# Patient Record
Sex: MALE | Race: BLACK OR AFRICAN AMERICAN | Employment: FULL TIME | URBAN - METROPOLITAN AREA
[De-identification: names, ages, dates, MRNs, and addresses within clinical notes are randomized per-mention and may not be internally consistent; named-entity substitution may affect disease eponyms.]

---

## 2022-12-13 NOTE — PROGRESS NOTES
New Patient Abstract    Reason for Referral: Multiple myeloma not having achieved remission    Referring Provider:  Rossana Kamara MD    Primary Care Provider: Evon Lofton MD    Family History of Cancer/Hematologic Disorders: There is no family history of cancer or hematologic disorders documented. Presenting Symptoms: Outpatient work-up for acute on chronic renal failure revealed elevated light chains consistent with multiple myeloma    Narrative with recent with Results/Procedures/Biopsies and Dates completed: Mr. Rahul Bowden is a 56-year-old male with a history of DM2, CKD4, myopia, and DOMENICO. He was admitted at [place] [address] on 10/20/22 after outpatient work-up for acute on chronic renal failure revealed elevated light chains consistent with multiple myeloma. He underwent renal biopsy on 10/21/22 which revealed kappa light chain cast nephropathy. Free kappa light chains on 10/21/22 were elevated at 13,243.8 and kappa/lambda ratio was elevated at 686.21. SPEP on 10/23/22 was significant for alpha-1 globulin 0.4, albumin 3.7, and abnormal protein band one 0.9. Evaluation revealed a restricted band (M-spike) migrating in the gamma globulin region. UPEP obtained on 10/23/22 showed UPEP 24-hour protein elevated at 23,875, protein/creatinine ratio 10,133, creatine urine 24 hour 2.36, and abnormal protein band one 15,599. A monoclonal type peak was present on the electrophoretogram which suggests a monoclonal gammopathy. Urine immunofixation showed abnormal free kappa light chains present. Bone survey on 10/22/22 demonstrated no definitive bone or soft tissue abnormality to suggest or explain hypercalcemia; no suspicious lytic or blastic lesions or abnormal periosteal reaction; degenerative changes; and suggestion of slight superior endplate compression fracture deformity of L2 versus rim osteophyte.      Bone marrow biopsy on 10/26/22 revealed kappa restricted plasma cell neoplasm plasma cells estimated 80%; bone marrow cellularity estimated 90%; increased storage iron; and peripheral blood with hypochromic microcytic anemia. Pathologist noted, DN00+ clonal plasma cells are detected that coexpress aberrant CD56 and kappa cytoplasmic light chain and are negative for CD19 and CD20, comprising 4% of all analyzed white blood cells (cKappa 98%, cLambda 2%). Lymphocytes include polyclonal B cells, NK cells and immunophenotypically normal CD4+ and CD8+ T cells in normal proportions. Myeloid forms exhibit immunophenotypic evidence of normal maturation. Blasts are not increased.  Chromosome analysis showed an abnormal, hyperdiploid karyotype. FISH myeloma IGH panel showed positive FISH result for gain of extra IGH signals and gain of CCND1; negative for IGH::FGFR3, IGH::CCND1, IGH::MAF, IGH::MAFB rearrangement. The extra IGH signals were most consistent with an [address] translocation with another partner. The prognostic significance of this finding is uncertain. FISH myeloma risk assessment panel showed positive FISH result for hyperdiploidy; negative for gain of 1q21 and deletion 13q/monosomy 13. Leukemia/lymphoma eval showed involvement by a plasma cell neoplasm. CD38+ clonal plasma cells were detected that coexpress aberrant CD56 and kappa cytoplasmic light chain and were negative for CD19 and CD20, comprising 4% of all analyzed white blood cells (cKappa 98%, cLambda 2%). Lymphocytes included polyclonal B cells, NK cells and immunophenotypically normal CD4+ and CD8+ T cells in normal proportions. Myeloid forms exhibited immunophenotypic evidence of normal maturation. Blasts were not increased. During admission he was seen by hematology service and was started on chemotherapy with Cytoxan and bortezomib on 10/24. He tolerated the chemotherapy well and had no significant complications or adverse reactions. The patient was also started on plasmapheresis and completed a total of 5 sessions.  He was discharged on 10/28/22 with outpatient hematology follow-up with Dr. Ravi Byrd at [address] [place] [address]. PET CT on 11/10/22 demonstrated mild diffuse homogeneous increased FDG accumulation in bone marrow, consistent with the clinical history of multiple myeloma; and a healing fracture at the lateral right 7th rib with an SUV max of 5.1. There were no other focal bone lesions. Patient's diagnosis is Multiple Myeloma stage 3 [b2 microglobulin is 10 (> 5.5) so stage 3]. Light chains decreased after admission with cytoxan and fluids in hospital. Patient is currently being treated with [facility] Bortezomib + Cyclophosphamide IV + Dexamethasone (Q 21 days). He is on cycle 3 of a planned 4 cycles set to run from 10/24/2022 to 1/10/2023. MD notes, Continue chemotherapy with CyborD due to renal failure. Discussed case with Dr. Colt Gaston and plan to refer him to [facility] for transplant evaluation. Also if CyborD not enough to control myeloma will consider VDT-PACE regimen. Revlimid not possible due to renal dysfunction.      KIDNEY ULTRASOUND 10/17/2022   FINDINGS: The kidneys demonstrate no evidence of hydronephrosis, mass, appreciable cortical thinning, cortical scarring, or shadowing renal stone. Bilaterally the kidneys exhibit generalized symmetric decreased corticomedullary differentiation and increased echogenicity of the kidney cortex which is nonspecific but would be most commonly seen related to chronic medical renal disease. The right kidney length measures approximately 11.7 cm. The left kidney length measures approximately 12.7 cm. The kidneys otherwise are negative bilaterally. The urinary bladder contains urine, is not abnormally distended and is normal in appearance. Permanent recorded images were made and are on file. IMPRESSION: Ultrasound features of the kidneys are most compatible with chronic medical renal disease, as described.      RENAL BIOPSY 10/21/22      XR OSSEOUS SURVEY 10/22/22  FINDINGS:   Bones/joints: Proximal right tibial oblong radiodensity measuring 1.7 cm compatible with bone island. Mineralization appears normal.  Cervical, thoracic and lumbar spondylosis changes. Suggestion of slight superior endplate compression fracture deformity of L2 versus rim osteophyte. No evidence of fracture otherwise. Joints are unremarkable for age. No suspicious lytic or blastic lesions or abnormal periosteal reaction. Soft tissues: Unremarkable. Right IJ central venous dialysis catheter with its tip in the SVC. Visualized lungs are clear. No evidence of calculi. IMPRESSION:   1. No definitive bone or soft tissue abnormality to suggest or explain hypercalcemia. 2. No suspicious lytic or blastic lesions or abnormal periosteal reaction. 3. Degenerative changes. Suggestion of slight superior endplate compression fracture deformity of L2 versus rim osteophyte. 4. Right IJ central venous dialysis catheter with its tip in the SVC. Visualized lungs are clear. IGM 10/22/22      IGA 10/22/22          PROTEIN ELECTROPHORESIS 10/23/22   Ref Range & Units 1 mo ago Comments   Protein Total 6.1 - 8.1 g/dL 6.7     Alpha-1 Globulin 0.2 - 0.3 g/dL 0.4 High      Alpha-2 Globulin 0.5 - 0.9 g/dL 0.6     Albumin 3.8 - 4.8 g/dL 3.7 Low      Beta-1 Globulin 0.4 - 0.6 g/dL 0.4     Gamma Globulin 0.8 - 1.7 g/dL 1.4     Abnormal Protein Band 1 g/dL 0.9 High          Reference Range: None Detected   Abnormal Protein Band 2  NO RESULT     Abnormal Protein Band 3  NO RESULT     SPEP Interpretation  SEE BELOW Abnormal   Evaluation reveals a restricted band (M-spike) migrating in the gamma globulin region. If not already requested, Immunofixation should be considered.    Beta-2 Globulin 0.2 - 0.5 g/dL 0.3       PROTEIN ELECTROPHORESIS 11/17/22   Ref Range & Units 3 wk ago Comments   Protein Total 6.1 - 8.1 g/dL 7.6     Alpha-1 Globulin 0.2 - 0.3 g/dL 0.4 High      Alpha-2 Globulin 0.5 - 0.9 g/dL 0.7     Albumin 3.8 - 4.8 g/dL 4.4 Beta-1 Globulin 0.4 - 0.6 g/dL 0.4     Gamma Globulin 0.8 - 1.7 g/dL 1.6     Abnormal Protein Band 1 g/dL 1.2 High          Reference Range: None Detected   Abnormal Protein Band 2  SEE BELOW  The M-Karl is too small to quantitate. Reference Range: None Detected   Abnormal Protein Band 3  NO RESULT     SPEP Interpretation  SEE BELOW Abnormal   Evaluation reveals two restricted bands (M-Karl) migrating in the gamma globin region. One of the bands is too small to quantitate. Consider immunofixation   analysis if indicated. Beta-2 Globulin 0.2 - 0.5 g/dL 0.3       UPEP 10/23/22   Ref Range & Units 1 mo ago Comments   Total Volume mL 6,250     Protein, UPEP, 24 Hour <100 mg/24 h 23,875 High      Protein/Creatinine Ratio (mg/g creat) <100 mg/g creat 10,133 High      Protein/Creatinine Ratio (mg/mg Creat) <0.100 mg/mg Creat 10.133 High      Creatinine Urine 24 hr 0.50 - 2.15 g/24 h 2.36 High      Albumin, Urine % 4     Alpha-1 Globulin, Urine % 2     Alpha-2 Globulin, Urine % 11     Beta Globulin, Urine % 12     Gamma Globulin, Urine % 72     Abnormal Protein Band 1, Urine mg/24 h 15,599 High      Abnormal Protein Band 2, Urine  NO RESULT     Abnormal Protein Band 3, Urine  NO RESULT     Interpretation  SEE BELOW Abnormal   A monoclonal type peak is   present on the electrophoretogram which suggests   a monoclonal gammopathy.      URINE IMMUNOFIXATION 10/23/22      24-HOUR URINE PROTEIN            SURGICAL PATHOLOGY EXAM 10/26/22  FINAL DIAGNOSIS   BONE MARROW ASPIRATE, CLOT SECTION, AND BIOPSY: KAPPA RESTRICTED PLASMA CELL NEOPLASM PLASMA CELLS ESTIMATED 80% BONE MARROW CELLULARITY ESTIMATED 90%   INCREASED STORAGE IRON   PERIPHERAL BLOOD: HYPOCHROMIC MICROCYTIC ANEMIA   COMMENT: [employer] flow cytometric analysis:   **INVOLVEMENT BY A PLASMA CELL NEOPLASM (SEE COMMENT)**  CD38+ clonal plasma cells are detected that coexpress aberrant CD56 and kappa cytoplasmic light chain and are negative for CD19 and CD20, comprising 4% of all analyzed white blood cells (cKappa 98%, cLambda 2%). Lymphocytes include polyclonal B cells, NK cells and immunophenotypically normal CD4+ and CD8+ T cells in normal proportions. Myeloid forms exhibit immunophenotypic evidence of normal maturation. Blasts are not increased. COMMENT: Plasma cell enumeration by flow cytometry may not be entirely representative of the degree of marrow infiltration due to variable sampling; correlation with counts on morphological material is recommended. Flow Cytometry reviewed by Ngozi Duran M.D. CHROMOSOME ANALYSIS, HEMATOLOGIC MALIGNANCY RESULT 10/26/22  Component 1 mo ago Comments   Chromosome Analysis, Hematologic Malignancy Result  SEE BELOW  Order ID:                 [id number]     Specimen Type:            Bone Marrow     Clinical Indication:      Multiple myeloma     RESULT:   ABNORMAL, HYPERDIPLOID KARYOTYPE     INTERPRETATION:   Chromosome analysis revealed an abnormal karyotype in 65 % of   G-banded metaphase cells analyzed with multiple chromosomal   abnormalities including one cell with t(8;14)(q24;q32) and   potentially an IGH::MYC rearrangement. These results are compatible   with plasma cell neoplasm/myeloma in conjunction with concurrent flow cytometry report. Sixty percent of cells analyzed have a hyperdiploid karyotype with   gains of chromosomes  2, 3, 5, 6, 7, 9, 11, 15, 18, 19 and 21 (clone   1). Five percent of cells analyzed,  in addition to the gains of   chromosomes as mentioned in clone 1, have loss of chromosomes 6 and 15; an unbalanced rearrangement of 1p with possibly a net deletion of 1p; a dic(3;4)(q25;q31) with resultant partial deletion of 3q and 4q distal regions; a del(6)(p23p21); an unbalanced rearrangement of 7q, add(7)(q11.2); an apparently balanced t(8;14)(q24;32) with   potentially an IGH::MYC rearrangement; and 4 markers. (clone 2).  The   presence of two related clones is indicative of clonal evolution with   possibly disease progression. The abnormal cells grew out in IL4 stimulated cultured cells   suggestive of a myeloma-related disorder. Concurrent interphase FISH   studies were positive for a rearrangement of IGH and for gains of 9,   11 and 15. Detection of any cytogenetic abnormality on conventional   metaphase cytogenetics indicates a more proliferative form of myeloma and hence an adverse prognosis. Rearrangement of 8q/MYC in myeloma is also suggestive of disease progression. Since only one cell with 8;14 was detected, it is highly recommended to perform [facility] study for further clarification and to support the clonal change. Please expect the results of any other concurrent study in a separate   report. Culture Type: 48 hour unstimulated and IL4 stimulated cultures. RECOMMENDATIONS: Correlation with other clinical and laboratory findings is recommended. Periodic monitoring may be useful in assessing remission/relapse status. Specific interphase FISH assay, for t(8;14)(q24;q32) IGH::MYC should   be considered; this can be attempted on the same sample submitted. To order, please call the Cytogenetics Department. An additional charge will be assessed for this study. NOMENCLATURE:   59,XY,+2,+3,+5,+6,+7,+9,+9,+11,+15,+15,+18,+19,+21[12]/   60,XY,idem,add(1)(p12),dic(3;4)(q25;q31),-6,del(6)(p23p21),add(7)(q11.   2),t(8;14)(q24;32),-15,+4mar[1]/   46,XY[7]   ASSAY INFORMATION:   Method:                   G-Band (Digital Analysis:   [employer])   Cells Counted:            20   Band Level:               400   Cells Analyzed:           20   Cells Karyotyped:         4     Small clonal populations and subtle chromosome abnormalities may not be identified.       FISH, MYELOMA, IGH PANEL 10/26/22  Component 1 mo ago Comments   FISH, MYELOMA, IGH PANEL  SEE BELOW  Order ID:                 [id number]   Specimen Type:            Bone Marrow   Clinical Indication:      Multiple myeloma RESULT: POSITIVE FISH RESULT FOR GAIN OF EXTRA IGH SIGNALS and GAIN of CCND1; NEGATIVE for IGH::FGFR3, IGH::CCND1, IGH::MAF, IGH::MAFB REARRANGEMENT   INTERPRETATION: This test was performed on  enriched cells. FISH analysis, using probes described below, revealed gain of extra   IGH (14q32) in about 75% of interphase cells examined. The FISH   analysis did not detect IGH::FGFR3, IGH::CCND1, IGH::MAF, or   IGH::MAFB rearrangement; thus ruled out the presence of t(4;14),   t(11;14), t(14;16), and t(14;20). The extra IGH signals are most   consistent with an [address] translocation with another partner. The   prognostic significance of this finding is uncertain. Though FISH provided no evidence for IGH-CCND1 (11;14) fusion, an   extra copy of CCND1 at 11q13 was observed in 77% of interphase cells   examined, consistent with the gain of chromosome 11 which was   detected in a concurrent FISH study (see separate report). Please expect the results of any other concurrent study in a separate report. RECOMMENDATIONS: Correlation with a chromosome result, any concurrent FISH studies, as well as other clinical and laboratory findings is recommended. Periodic monitoring may be useful in assessing remission/relapse status. NOMENCLATURE:   nuc   jessi(RRVY3f4,IGHx3~4)[75/100],(OMQJ0r2,IGHx3~4)[77/100],(IGHx3~4,MAFx2)   [82/100],(IGHx3~4,MAFBx2)[73/100]     ASSAY INFORMATION:   Method: FISH Manual Analysis   Cells Counted: 400     This test will not detect other cytogenetic abnormalities that may   have clinical significance. Fluorescence in-situ hybridization (FISH)   was performed using the probes specific for rearrangements of   chromosome regions FGFR3::IGH t(4;14)(p16.3;q32) [[employer]],   CCND1::IGH t(11;14)(q13;q32) [[employer]], IGH::MAF   t(14;16)(q32;q23) [[employer]], IGH::MAFB t(14;20)(q32;q12)   [[employer]].      Cutoff Value: t(11;14)(CCND1::IGH) (1%); t(4;14)(FGFR3::IGH) (2%); gain (9%), del(1p) (11%); KUT(62F04) (12%), -13   (9%); +9/11/15 (7%/7%/9%)      LEUKEMIA/LYMPHOMA EVALUATION 10/26/22   Ref Range & Units 1 mo ago Comments   Clinical Information  SEE BELOW  Multiple Myeloma   Viability % 97     Interpretation  SEE BELOW  **INVOLVEMENT BY A PLASMA CELL NEOPLASM (SEE COMMENT)**     CD38+ clonal plasma cells are detected that coexpress aberrant CD56 and kappa cytoplasmic light chain and are negative for CD19 and CD20,   comprising 4% of all analyzed white blood cells (cKappa 98%, cLambda 2%). Lymphocytes include polyclonal B cells, NK cells and immunophenotypically normal CD4+ and CD8+ T cells in normal proportions. Myeloid forms exhibit immunophenotypic evidence of normal   maturation. Blasts are not increased. COMMENT: Plasma cell enumeration by flow cytometry may not be entirely   representative of the degree of marrow infiltration due to variable sampling; correlation with counts on morphological material is   recommended. Flow Cytometry reviewed by Seth Powell M.D. Sample Description  SEE BELOW  The analyzed WBCs in the sample include 19% lymphocytes, 5% monocytes and 70% are maturing myeloid cells. 0.7% display weak CD45 and low side scatter consistent with blasts. 4% plasma cells.    Markers  SEE BELOW                                 [place] A - Maturing Myeloid Cells   Marker    Percentage   CD2          0   CD3          0   CD4          0   CD5          0   CD7          0   CD8          0   CD10         72   CD11c        81   CD13         83   CD19         0   CD19+CD5+    0   CD20         0   CD23         0   CD33         2   CD34         0   CD38         9   CD45         99   CD56+CD3-    4   CD64         1           0   HLA_DR       2   Kappa CD19+  0   Lambda CD19+ 0   K/L Ratio    NA                                  [place] B - Lymphocytes   Marker    Percentage   CD2          78   CD3          73   CD4          53   CD5          76   CD7          71   CD8 17   CD10         2   CD11c        7   CD13         0   CD19         19   CD19+CD5+    3   CD20         19   CD23         12   CD33         0   CD34         0   CD38         61   CD45         100   CD56+CD3-    5   CD64         0           0   HLA_DR       19   Kappa CD19+  13   Lambda CD19+ 6   K/L Ratio    2.17         Specimen Type  BONE MARROW     Number of Markers  24     Gating Strategy  SEE BELOW  Maturing myeloid cells and lymphocytes were selected for analysis based on CD45 staining intensity, forward scatter and side scatter. Pathologist Date  NO RESULT     REPORT TYPE  NO RESULT     Pathologist Name  NO RESULT       PET/CT WHOLE BODY 11/10/22  FINDINGS: Limitations: Image quality is degraded by streak artifact caused by inclusion of the patient's arms within the scan plane. Brain: Visualized brain has normal physiologic uptake. Salivary glands: Mild diffuse increased FDG avidity in the submandibular and parotid glands is consistent with a benign etiology. SUV max in both submandibular glands is 4.4. SUV max in the right parotid is 5.3. SUV max in   the left parotid is 4.2. Pharynx: No abnormal uptake. Larynx: No abnormal uptake. Lungs, pleura and trachea: No abnormal uptake. Heart: Normal physiologic uptake. Mediastinal space: No abnormal uptake. Liver: No abnormal uptake. Gallbladder and bile ducts: No abnormal uptake. Pancreas: No abnormal uptake. Spleen: No abnormal uptake. Adrenal glands: No abnormal uptake. Kidneys and ureters: Normal physiologic uptake. Stomach and bowel: No abnormal uptake. Vasculature: No abnormal uptake. Lymph nodes: No abnormal uptake. No lymphadenopathy in the head, neck, chest, abdomen, pelvis or extremities. Bones/joints: Mild diffuse homogeneous increased FDG accumulation in bone marrow is consistent with the clinical history of multiple myeloma.  For example, SUV max in the L4 vertebral body is 6.6, SUV max in the posterior right ilium is 7.4, and SUV max in the proximal right femur is 6.1. SUV max at a healing fracture involving the lateral right 7th rib has an SUV max of 5.1 (images 3-139 and 2-139). In retrospect, it was potentially visible as a subtle abnormality on the bone survey on 10/22/2022 (series 1018). A healed fracture at the lateral right 6th rib is not FDG avid (image 2-127). Soft tissues: No metabolically active areas. Mild extravasation of FDG from the right antecubital injection site. IMPRESSION:   1. Mild diffuse homogeneous increased FDG accumulation in bone marrow is consistent with the clinical history of multiple myeloma. 2. A healing fracture lateral right 7th rib has an SUV max of 5.1. There are no other focal bone lesions.       Notes from Referring Provider: None    Other Pertinent Information: None    Presented at Tumor Board: N/A

## 2022-12-21 ENCOUNTER — OFFICE VISIT (OUTPATIENT)
Dept: ONCOLOGY | Age: 52
End: 2022-12-21
Payer: COMMERCIAL

## 2022-12-21 ENCOUNTER — HOSPITAL ENCOUNTER (OUTPATIENT)
Dept: LAB | Age: 52
Discharge: HOME | End: 2022-12-24
Payer: COMMERCIAL

## 2022-12-21 ENCOUNTER — CLINICAL DOCUMENTATION (OUTPATIENT)
Dept: CASE MANAGEMENT | Age: 52
End: 2022-12-21

## 2022-12-21 VITALS
DIASTOLIC BLOOD PRESSURE: 78 MMHG | HEART RATE: 84 BPM | WEIGHT: 265.4 LBS | RESPIRATION RATE: 17 BRPM | BODY MASS INDEX: 35.95 KG/M2 | OXYGEN SATURATION: 100 % | TEMPERATURE: 98 F | SYSTOLIC BLOOD PRESSURE: 140 MMHG | HEIGHT: 72 IN

## 2022-12-21 DIAGNOSIS — D84.9 IMMUNOCOMPROMISED (HCC): ICD-10-CM

## 2022-12-21 DIAGNOSIS — C90.00 MULTIPLE MYELOMA NOT HAVING ACHIEVED REMISSION (HCC): Primary | ICD-10-CM

## 2022-12-21 DIAGNOSIS — C90.00 MULTIPLE MYELOMA NOT HAVING ACHIEVED REMISSION (HCC): ICD-10-CM

## 2022-12-21 LAB
ALBUMIN SERPL-MCNC: 3.7 G/DL (ref 3.5–5)
ALBUMIN/GLOB SERPL: 0.9 {RATIO} (ref 0.4–1.6)
ALP SERPL-CCNC: 122 U/L (ref 50–136)
ALT SERPL-CCNC: 20 U/L (ref 12–65)
ANION GAP SERPL CALC-SCNC: 6 MMOL/L (ref 2–11)
AST SERPL-CCNC: 10 U/L (ref 15–37)
BASOPHILS # BLD: 0 K/UL (ref 0–0.2)
BASOPHILS NFR BLD: 0 % (ref 0–2)
BILIRUB SERPL-MCNC: 0.3 MG/DL (ref 0.2–1.1)
BUN SERPL-MCNC: 45 MG/DL (ref 6–23)
CALCIUM SERPL-MCNC: 8.7 MG/DL (ref 8.3–10.4)
CHLORIDE SERPL-SCNC: 115 MMOL/L (ref 101–110)
CO2 SERPL-SCNC: 20 MMOL/L (ref 21–32)
CREAT SERPL-MCNC: 4.8 MG/DL (ref 0.8–1.5)
DIFFERENTIAL METHOD BLD: ABNORMAL
EOSINOPHIL # BLD: 0.1 K/UL (ref 0–0.8)
EOSINOPHIL NFR BLD: 1 % (ref 0.5–7.8)
ERYTHROCYTE [DISTWIDTH] IN BLOOD BY AUTOMATED COUNT: 18.6 % (ref 11.9–14.6)
GLOBULIN SER CALC-MCNC: 4 G/DL (ref 2.8–4.5)
GLUCOSE SERPL-MCNC: 106 MG/DL (ref 65–100)
HCT VFR BLD AUTO: 26.7 %
HGB BLD-MCNC: 8.6 G/DL (ref 13.6–17.2)
IMM GRANULOCYTES # BLD AUTO: 0.1 K/UL (ref 0–0.5)
IMM GRANULOCYTES NFR BLD AUTO: 1 % (ref 0–5)
LYMPHOCYTES # BLD: 1.1 K/UL (ref 0.5–4.6)
LYMPHOCYTES NFR BLD: 10 % (ref 13–44)
MAGNESIUM SERPL-MCNC: 2.3 MG/DL (ref 1.8–2.4)
MCH RBC QN AUTO: 24.8 PG (ref 26.1–32.9)
MCHC RBC AUTO-ENTMCNC: 32.2 G/DL (ref 31.4–35)
MCV RBC AUTO: 76.9 FL (ref 82–102)
MONOCYTES # BLD: 1 K/UL (ref 0.1–1.3)
MONOCYTES NFR BLD: 9 % (ref 4–12)
NEUTS SEG # BLD: 8.5 K/UL (ref 1.7–8.2)
NEUTS SEG NFR BLD: 79 % (ref 43–78)
NRBC # BLD: 0 K/UL (ref 0–0.2)
PLATELET # BLD AUTO: 329 K/UL (ref 150–450)
PMV BLD AUTO: 12.8 FL (ref 9.4–12.3)
POTASSIUM SERPL-SCNC: 4.2 MMOL/L (ref 3.5–5.1)
PROT SERPL-MCNC: 7.7 G/DL (ref 6.3–8.2)
RBC # BLD AUTO: 3.47 M/UL (ref 4.23–5.6)
SODIUM SERPL-SCNC: 141 MMOL/L (ref 133–143)
WBC # BLD AUTO: 10.8 K/UL (ref 4.3–11.1)

## 2022-12-21 PROCEDURE — 85025 COMPLETE CBC W/AUTO DIFF WBC: CPT

## 2022-12-21 PROCEDURE — 82784 ASSAY IGA/IGD/IGG/IGM EACH: CPT

## 2022-12-21 PROCEDURE — 36415 COLL VENOUS BLD VENIPUNCTURE: CPT

## 2022-12-21 PROCEDURE — 83521 IG LIGHT CHAINS FREE EACH: CPT

## 2022-12-21 PROCEDURE — 83735 ASSAY OF MAGNESIUM: CPT

## 2022-12-21 PROCEDURE — 99205 OFFICE O/P NEW HI 60 MIN: CPT | Performed by: INTERNAL MEDICINE

## 2022-12-21 RX ORDER — ACYCLOVIR 200 MG/1
200 CAPSULE ORAL 2 TIMES DAILY
COMMUNITY
Start: 2022-12-20

## 2022-12-21 RX ORDER — CLINDAMYCIN HYDROCHLORIDE 150 MG/1
CAPSULE ORAL EVERY 6 HOURS PRN
COMMUNITY
Start: 2022-12-12

## 2022-12-21 RX ORDER — LIDOCAINE AND PRILOCAINE 25; 25 MG/G; MG/G
CREAM TOPICAL PRN
COMMUNITY
Start: 2022-11-14

## 2022-12-21 RX ORDER — PANTOPRAZOLE SODIUM 20 MG/1
20 TABLET, DELAYED RELEASE ORAL
COMMUNITY
Start: 2022-11-29 | End: 2022-12-29

## 2022-12-21 RX ORDER — INSULIN LISPRO 100 [IU]/ML
INJECTION, SOLUTION INTRAVENOUS; SUBCUTANEOUS
COMMUNITY
Start: 2022-11-07

## 2022-12-21 RX ORDER — FLASH GLUCOSE SENSOR
KIT MISCELLANEOUS
COMMUNITY
Start: 2022-12-05

## 2022-12-21 RX ORDER — CYCLOBENZAPRINE HCL 10 MG
10 TABLET ORAL
COMMUNITY
Start: 2022-10-28

## 2022-12-21 RX ORDER — DEXAMETHASONE 4 MG/1
TABLET ORAL
COMMUNITY
Start: 2022-11-28

## 2022-12-21 RX ORDER — SODIUM BICARBONATE 650 MG/1
1300 TABLET ORAL 3 TIMES DAILY
COMMUNITY
Start: 2022-11-16

## 2022-12-21 ASSESSMENT — PATIENT HEALTH QUESTIONNAIRE - PHQ9
SUM OF ALL RESPONSES TO PHQ QUESTIONS 1-9: 0
SUM OF ALL RESPONSES TO PHQ QUESTIONS 1-9: 0
SUM OF ALL RESPONSES TO PHQ9 QUESTIONS 1 & 2: 0
SUM OF ALL RESPONSES TO PHQ QUESTIONS 1-9: 0
2. FEELING DOWN, DEPRESSED OR HOPELESS: 0
1. LITTLE INTEREST OR PLEASURE IN DOING THINGS: 0
SUM OF ALL RESPONSES TO PHQ QUESTIONS 1-9: 0

## 2022-12-21 NOTE — PROGRESS NOTES
Outpatient Consult Note    Data Source: Patient, [facility] record. 12/21/2022  1:37 PM      Edgar Mclean [id number]    47 y.o. 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 Now kindly referred to us by Dr. Randall Mederos for his history of MM and possible stem cell transplant consideration. Hematologic history as follows:    - 10/22: presented with AGUS (creatinine over 5) along with anemia (hgb 9 range). Saw hematology Dr. Randall Mederos. IgG elevated 1612. SPEP with IgG kappa m-spike 0.9 g/dl. Free kappa light chain 13,243. 24 hr UPEP with urine protein 16,981 over 24 hours. [place] [address]. Bone marrow biopsy showing marrow cellularity 90% with 80 % involvement kappa restricted plasma cells. Cytogenetics with standard risk featinres. - Pet 11/10/22 with diffuse increase in bone marrow and healing right 7th rib fracture. Patient initially hospitalized and underwent plasma exchange, subsequently started on CyborD q 21 days x 3. Briefly admitted 11/5/22 with rebound increase in light chain requiring cytoxan 1 gram IV x 1 He is now scheduled to start cycle 4 next week. Now presents to us for further workup. Today reports feeling well. Freely able to move in the room. Tolerating therapy well thus far. Has not needed hemodialysis thus far. Past Medical History:   Diagnosis Date    Cancer ([facility] [address]     Diabetes mellitus ([facility] [address]          No past surgical history on file.       Current Outpatient Medications   Medication Sig Dispense Refill    acyclovir (ZOVIRAX) 200 MG capsule Take 200 mg by mouth 2 times daily      Continuous Blood Gluc Sensor (FREESTYLE DUC 2 SENSOR) MISC       clindamycin (CLEOCIN) 150 MG capsule every 6 hours as needed      cyclobenzaprine (FLEXERIL) 10 MG tablet Take 10 mg by mouth      dexamethasone (DECADRON) 4 MG tablet Take 5 tablets by mouth daily on Mon and Thur only      insulin lispro, 1 Unit Dial, (HUMALOG/ADMELOG) 100 UNIT/ML SOPN Administer up to three times daily before meals per sliding scale instructions. Low Dose Insulin Scale if BGL: [</= 150, Give No Insulin]; [852-641, Give 1 unit]; [452-608, Give 2 units]; [250-299, Give 3 units]; [300-349, Give 4 units]; [350-399, Give 5 units]; [=/> 400, Give 6 units and call physician.]      lidocaine-prilocaine (EMLA) 2.5-2.5 % cream Apply topically as needed      linagliptin (TRADJENTA) 5 MG tablet Take 5 mg by mouth daily      pantoprazole (PROTONIX) 20 MG tablet Take 20 mg by mouth every morning (before breakfast)      sodium bicarbonate 650 MG tablet Take 1,300 mg by mouth 3 times daily       No current facility-administered medications for this visit. Social History     Socioeconomic History    Marital status:      Spouse name: None    Number of children: None    Years of education: None    Highest education level: None   Tobacco Use    Smoking status: Never    Smokeless tobacco: Never   Vaping Use    Vaping Use: Never used   Substance and Sexual Activity    Alcohol use: Yes     Comment: socially    Drug use: Never         No family history on file. REVIEW OF SYSTEMS:  As mentioned above, all other systems were reviewed in full and are negative. No Known Allergies        PHYSICAL EXAMINATION:  General Appearance: Healthy appearing patient in no acute distress  Vitals were reviewed. BP (!) 140/78 (Site: Right Upper Arm, Position: Sitting, Cuff Size: Large Adult)   Pulse 84   Temp 98 °F (36.7 °C) (Oral)   Resp 17   Ht 6' 0.24\" (1.835 m)   Wt 265 lb 6.4 oz (120.4 kg)   SpO2 100%   BMI 35.75 kg/m²   HEENT: No oral or pharyngeal masses, ulceration or thrush noted, no sinus tenderness. Neck is supple with no thyromegaly or JVD noted.   Lymph Nodes: No lymphadenopathy noted in the occipital, pre and post auricular, cervical, supra and started on CyborD q 21 days x 3. Briefly admitted 11/5/22 with rebound increase in light chain requiring cytoxan 1 gram IV x 1 He is now scheduled to start cycle 4 next week. Now presents to us for further workup. Today reports feeling well. Freely able to move in the room. Tolerating therapy well thus far. Has not needed hemodialysis thus far. IgG Kappa MM,  ISS 3, Standard risk (presenting with AGUS, anemia, and significant proteinuria). PLAN:  - As above. Given slow response to CyBorD, consider changing to KRD (renally dosed w revlimid 5 mg 3/4 weeks)  - Will consider ASCT in first remission. Patient and wife appear interested. - Check labs today including SPEP, LC.     RTC in 3 months w labs, repeat SPEP, LC    I truly appreciate the kind referral from Dr. Tayla Sky. Please call w/ any questions    Total time 60 min 50% in direct consultation about the patient's diagnosis and management. All questions answered.   Colette Tyler MD  [address] [place] [address] Hematology Oncology  [address] [place] [address]  Office : [phone]  Fax : [phone]  ]

## 2022-12-21 NOTE — PROGRESS NOTES
12/21 Pt and spouse arrived for BMT eval. Dr. Storm Pal recommends switching treatment to [address] then coming back to office in 3 months to further discuss ASCT. MD discussed plan with Dr. Linda English. Eduction booklet and handout given on bone marrow transplant and the new chemo regimen. Contact information given to pt. All questions answered. Pt and spouse verbalized understanding. RTC 3/28/23.

## 2022-12-21 NOTE — PATIENT INSTRUCTIONS
Patient Instructions from Today's Visit    Reason for Visit:  Follow-up      Plan:    - Our first goal is to get you into remission. You are not responding a little slower than we would like. SO we would like to change your chemo regimen. Stop the Velcade and Cytoxan. Add Kyprolis IV & Revlimid this is a pill  KRD (we will just use a lower dose). You usually take these medications weekly for 3 weeks and off a week. This is still a four week regimen. - Your Kidneys are not going to get better unless we get your myeloma under control.     - You have four options: ONE: Stop after you are in remission (this is for older patients) TWO: maintanance- this is taking a lower doses of chemotherapy to keep multiple myeloma (MM) dormant. THREE: Allogeneic transplant- this is taking bone marrow from someone else and giving it to you. There are a lot side effects with this type of transplant. Not appropriate for you. FOUR: Autologous transplant- We give you high dose chemo. Remove your stem cells through a process called mobilization. Then 1-2 weeks later we will return your cells through a central line. 95 out of 100 patients have no compilations, and most of these patients live longer.     - Standard of care for healthy patient is get you into remission, then go to Auto Stem Cell Transplant, (ASCT)  then do maintenance treatment. - Multiple myeloma is not curable disease, but a treatable disease. Our goal is turn this into a chronic disease, that way you can continue to complete the activities you enjoy. - Prior to transplant you will get pre studies to evaluate your heart function, lung function, bone marrow. - Before the transplant you will get Transplant Education, Finical education, Apheresis Education, and a psychosocial evaluation.     - With a ASCT you receive  shots for five days that move the stem cells into blood stream, then we collect the stem cells through apheresis.  Then about 2 weeks later you get Melphalan (strong chemo). The very next day you get your cells back. - If outpatient you will come in EVERYday until you engraft (means counts recover) that usual take about 2 weeks. Then we will continue to follow you closely after that. Usually anywhere from 3 to 1 times a week then we will cut back the office visits.     - Every few years new drugs are getting approved that gives Myeloma patients more options, therefore lengthens life spans.    - A bone marrow transplant can extend your life. After transplant you will move to maintenance therapy, which is once or twice a month. Maintenance therapy is more easily tolerated. - Transplants can be completed in a inpatient or outpatient setting.        - You will start vaccines back at one year. This includes all your baby shots. - It takes you about three months to get back to baseline. Follow Up:  As scheduled    Recent Lab Results:  No visits with results within 3 Day(s) from this visit. Latest known visit with results is:   No results found for any previous visit. Treatment Summary has been discussed and given to patient: N/A        -------------------------------------------------------------------------------------------------------------------  Please call our office at [phone] if you have any  of the following symptoms:   Fever of 100.5 or greater  Chills  Shortness of breath  Swelling or pain in one leg    After office hours an answering service is available and will contact a provider for emergencies or if you are experiencing any of the above symptoms. Patient did express an interest in My Chart. My Chart log in information explained on the after visit summary printout at the [facility] desk.     VANESSA Tavarez, RN, [facility]  Hematology Navigator  [address] [place] [address]  PJCB:[phone]  Office: [phone]   Fax: [phone]  Email:  [email or web address]    Navigator is available by phone Monday through Friday 8 am to 4 pm.    If you need assistance after 4pm, or on the weekend please call [phone]. The answering service is available 24 hours a day, 7 days a week. Kyprolis        Generic Name(s): Carfilzomib    Malick Katerina is the trade name for the generic drug Carfilzomib. In some cases, health care professionals may use the generic name Carfilzomib when referring to the trade name Sushanta Katerina. Drug Type:    Esta Katerina is a targeted therapy. Kyprolis is classified as a Proteasome Inhibitor. (For more detail, see \"How this drug works\" below). What Kyprolis Is Used For:  Treatment of multiple myeloma  Note: If a drug has been approved for one use, physicians may elect to use this same drug for other problems if they believe it may be helpful. How Kyprolis Is Given:  As an intravenous/IV (into the vein) infusion. The amount of Kyprolis that you will receive depends on many factors, including your height and weight, your general health or other health problems, and the type of cancer or condition you have. Your doctor will determine your exact dosage and schedule. Side Effects:  Important things to remember about the side effects of Kyprolis: Most people will not experience all of the Kyprolis side effects listed. Kyprolis side effects are often predictable in terms of their onset, duration, and severity. Kyprolis side effects will improve after therapy is complete. Kyprolis side effects may be quite manageable. There are many options to minimize or prevent the side effects of Kyprolis. The following side effects are common (occurring in greater than 30%) for patients taking Kyprolis:    Low blood counts. Your white and red blood cells in addition to your platelets may temporarily decrease. This can put you at increased risk for infection, anemia and/or bleeding.   Platelet mere : about day 8 of cycle  Fatigue  Nausea  Shortness of breath  Diarrhea  Fever  These are less common side effects (occurring in about 10-29%) of patients receiving Kyprolis:    Upper respiratory infection  Headache  Cough  Swelling  Increased kidney function tests (creatinine)  Vomiting  Constipation  Back pain  Difficulty sleeping  Muscle aches  Chills  Muscle spasms  Numbness and tingling in hands and feet  Weakness  Low potassium  Low magnesium  High blood pressure  Arm and leg pain  Dizziness  Increased liver enzymes (AST)  Pneumonia  Poor appetite  High blood sugar  High calcium  Low phosphorus  Chest wall pain  Low Sodium  Not all side effects are listed above. Side effects that are very rare -- occurring in less than about 10 percent of patients -- are not listed here. But you should always inform your health care provider if you experience any unusual symptoms. When to contact your doctor or health care provider:  Contact your health care provider immediately, day or night, if you should experience any of the following symptoms:    Fever of 100.4º F (38º C) or higher, chills (possible signs of infection)  Severe Shortness of breath  The following symptoms require medical attention, but are not an emergency. Contact your health care provider within 24 hours of noticing any of the following:    Diarrhea (4-6 episodes in a 24-hour period). Nausea (interferes with ability to eat and unrelieved with prescribed medication). Vomiting (vomiting more than 4-5 times in a 24 hour period). Unable to eat or drink for 24 hours or have signs of dehydration: tiredness, thirst, dry mouth, dark and decrease amount of urine, or dizziness. Shortness of breath, with or without cough and/or fever.   Skin or the whites of your eyes turn yellow  Urine turns dark or brown (tea color)  Decreased appetite  Pain on the right side of your stomach  Bleed or bruise more easily than normal  Itching or rash  Changes in thinking clearly with logic  Numbness or tingling in your hands or feet  · Signs of infection. These include: very bad sore throat, ear or sinus pain, cough, more sputum or change in color of sputum, pain with passing urine, mouth sores, wound that will not heal or anal itching or pain. Black or tarry stools, or blood in your stools  Blood in the urine  Pain or burning with urination  · Extreme fatigue (unable to carry on self-care activities)    Very bad swelling  Always inform your health care provider if you experience any unusual symptoms. Precautions:  Before starting Kyprolis treatment, make sure you tell your doctor about any other medications you are taking (including prescription, over-the-counter, vitamins, herbal remedies, etc.). Do not receive any kind of immunization or vaccination without your doctors approval while taking Kyprolis. Inform your health care professional if you are pregnant or may be pregnant prior to starting this treatment. Pregnancy category D (Lani Glaze may be hazardous to the fetus. Women who are pregnant or become pregnant must be advised of the potential hazard to the fetus. ). For both men and women: Use contraceptives, and do not conceive a child (get pregnant) while taking Kyprolis. Barrier methods of contraception, such as condoms, are recommended. Do not breast feed while taking Kyprolis. Self-Care Tips:  Drink at least two to three quarts of fluid every 24 hours, unless you are instructed otherwise. You may be at risk of infection so try to avoid crowds or people with colds, and report fever or any other signs of infection immediately to your health care provider. Wash your hands often. When working in your yard, wear protective clothing including long pants and gloves. Do not handle pet waste. Keep all cuts or scratches clean. Shower or bath daily and perform frequent mouth care. Do not cut cuticles or ingrown nails. You may wear nail polish, but not fake nails.   Ask your doctor or nurse before scheduling dental appointments or procedures. Ask your doctor or nurse before you, or someone you live with, has any vaccinations. Use an electric razor and a soft toothbrush to minimize bleeding. Avoid contact sports or activities that could cause injury. To reduce nausea, take anti-nausea medications as prescribed by your doctor, and eat small, frequent meals. Good mouth care, sucking hard, sugar-free candy, or chewing sugar-free gum may help. For constipation, drinking more liquids, working out, or adding fiber to your diet may help. Talk with your doctor about a stool softener or laxative. Avoid sun exposure. Wear SPF 13 (or higher) sunblock and protective clothing. In general, drinking alcoholic beverages should be kept to a minimum or avoided completely. You should discuss this with your doctor. Get plenty of rest.  Maintain good nutrition. If you experience symptoms or side effects, be sure to discuss them with your health care team. They can prescribe medications and/or offer other suggestions that are effective in managing such problems. For flu-like symptoms, keep warm with blankets and drink plenty of liquids. There are medications that can help reduce the discomfort caused by chills. Acetaminophen may help relieve discomfort from fever, headache and/or generalized aches and pains. However, be sure to talk with your doctor before taking it. Keep your mouth clean with baking soda and salt rinses. You can mix 1/2 to 1 tsp. of baking soda and/or 1/2 to 1 tsp. salt in 8 ounces of water, and use as a mouthwash, to avoid or decrease the severity of mouth sores. If you experience symptoms or side effects, be sure to discuss them with your health care team. They can prescribe medications and/or offer other suggestions that are effective in managing such problems. Monitoring and Testing:   You will be checked regularly by your doctor while you are taking Kyprolis, to monitor side effects and check your response to therapy. Periodic blood work will be obtained to monitor your complete blood count (CBC) as well as the function of other organs (such as your kidneys, lungs, heart and liver) will also be ordered by your doctor. · Because drug toxicity is seen, periodic physical examinations and review of your symptoms, which includes a check of your reflexes, is necessary. How Kyprolis Works:  Targeted therapy is the result of about 100 years of research dedicated to understanding the differences between cancer cells and normal cells. To date, cancer treatment has focused primarily on killing rapidly dividing cells because one feature of cancer cells is that divide rapidly. Unfortunately, some of our normal cells divide rapidly too, causing multiple side effects. Targeted therapy is about identifying other features of cancer cells. Scientists look for specific differences in the cancer cells and the normal cells. This information is used to create a targeted therapy to attack the cancer cells without damaging the normal cells, thus leading to fewer side effects. Each type of targeted therapy works a little bit differently but all interfere with the ability of the cancer cell to grow, divide, repair and/or communicate with other cells. There are different types of targeted therapies, defined in three broad categories. Some targeted therapies focus on the internal components and function of the cancer cell. The targeted therapies use small molecules that can get into the cell and disrupt the function of the cells, causing them to die. There are several types of targeted therapy that focus on the inner parts of the cells. Other targeted therapies target receptors that are on the outside of the cell. Therapies that target receptors are also known as monoclonal antibodies. The third broad category of targeted therapies is called anti-angiogenesis inhibitors.  These therapies target the blood vessels that supply oxygen to the cells, ultimately causing the cells to starve. Research continues to identify which cancers may be best treated with targeted therapies and to identify additional targets for more types of cancer. Kyprolis targets and inhibits the proteasome enzyme complex within the cell. Proteasome is part of the cellular machinery and has many functions within the cell, such as it helps to control the level of many of the proteins that help to regulate cell division and cell survival. By interfering with its function this can lead to apoptosis (cell suicide). In the laboratory, it has been shown that cancer cells are more susceptible to the effects of proteasome inhibitors, than normal cells are. In multiple myeloma, Kyprolis works by blocking the activation of certain molecules that allow plasma cells to \"nest\" in the bone marrow. Note: We strongly encourage you to talk with your health care professional about your specific medical condition and treatments. The information contained in this website is meant to be helpful and educational, but is not a substitute for medical advice. Revlimid        Generic Name: Lenalidomide    Drug Type:    Revlimid is classified as an \"immunomodulatory agent,\" and an \"antiangiogenic agent\" (for more detail, see \"How Revlimid Works\" section below.)    What Revlimid Is Used For:  Treatment of Multiple Myeloma  Treatment of transfusion-dependent myelodysplastic syndrome (MDS) patients with deletion 5q cytogenetic abnormality with or without additional cytogenetic abnormalities. Treatment of Mantle Cell Lymphoma (MCL)  Note: If a drug has been approved for one use, physicians may elect to use this same drug for other problems if they believe it may be helpful. How Revlimid Is Given:  Revlimid is taken as capsules by mouth. Swallow whole with water. Do not break, chew, or open capsules. If you miss a dose of Revlimid, take it as soon as you remember that day.  If you miss taking your dose for the entire day, go back to taking your regular dose the next day. Do not take 2 doses at the same time. Store Revlimid at room temperature, 59o to 86oF (15o to 30o C). In order to receive Revlimid, there are strict guidelines that you must follow. You will be required to participate in a special program called the \"RevAssistsm program\". Your doctor will determine your exact dosage and schedule. Dosage may be adjusted based on your blood count results. Side Effects:  Important things to remember about the side effects of Revlimid:    You will not get all of the side effects mentioned below. Side effects are often predictable in terms of their onset, duration, and severity. Side effects are almost always reversible and will go away after therapy is complete. Side effects are quite manageable. There are many options to minimize or prevent them. The following side effects are common (occurring in greater than 30%) for patients taking Revlimid:    Low blood counts . Your white blood cells and platelets may temporarily decrease. This can put you at increased risk for infection and/or bleeding. Blood counts are monitored closely and dose adjustments may be necessary. Diarrhea  Itching  Rash  Fatigue, tiredness  These are less common side effects for patients receiving Revlimid:    Constipation  Nausea  Sore throat  Fever  Back pain  Swelling of ankles or feet  Cough  Dizziness  Headache  Muscle cramps  Generalized weakness  Nose bleed  Infection  Dry skin  Anemia (low red blood cell count)  Low potassium  Difficulty sleeping  Poor appetite  Vomiting  A rare, but serious side effect of Revlimid is blood clots, including deep vein thrombosis (DVT) and pulmonary embolus (PE). You should seek emergency help and notify your health care provider immediately if you develop sudden chest pain and shortness of breath.  Notify your health care provider within 24 hours if you notice leg or arm swelling, redness, pain and/or skin warm to touch (signs and symptoms of possible blood clot). .    Not all side effects are listed above. Some that are rare (occurring in less than 10% of patients) are not listed here. However, you should always inform your health care provider if you experience any unusual symptoms. When to contact your doctor or health care provider:  Seek emergency help immediately and notify your health care provider, it you experience the following symptoms:    Sudden chest pain and shortness of breath. Contact your health care provider immediately, day or night, if you should experience any of the following symptoms:    Fever of 100.4° F (38° C) or higher, chills (possible signs of infection)  The following symptoms require medical attention, but are not an emergency. Contact your health care provider within 24 hours of noticing any of the following:    Unusual bleeding or bruising  Black or tarry stools, or blood in your stools. Blood in the urine. Diarrhea (4-6 episodes in a 24-hour period). Nausea (interferes with ability to eat and unrelieved with prescribed medication). Extreme fatigue (unable to carry on self-care activities). Leg or arm swelling, redness, pain and/or warm to touch. Always inform your health care provider if you experience any unusual symptoms. Precautions:  Before starting Revlimid treatment, make sure you tell your doctor about any other medications you are taking (including prescription, over-the-counter, vitamins, herbal remedies, etc.). Do not receive any kind of immunization or vaccination without your doctors approval while taking Revlimid. Revlimid is similar to the medication thalidomide a medication known to cause severe, life-threatening birth defects. Pregnancy category X (Revlimid may cause fetal harm when given to a pregnant woman. Revlimid must not be given to a pregnant woman or a woman who intends to become pregnant.  If a woman becomes pregnant while taking Revlimid, the medication must be stopped immediately and the woman given appropriate counseling). Women of childbearing potential will be treated only if they are able to comply with conditions of the RevAssistS program. Two forms of effective contraception are required beginning 4 weeks prior to, during, and for 4 weeks after therapy and during therapy interruptions. Males (even those vasectomized) must use a latex condom during any sexual contact with women of childbearing age. Risk to the fetus from semen of male patients is unknown. Do not breast feed while taking Revlimid. Self-Care Tips:  Drink at least two to three quarts of fluid every 24 hours, unless you are instructed otherwise. You may be at risk of infection so try to avoid crowds or people with colds, and report fever or any other signs of infection immediately to your health care provider. Wash your hands often. Use an electric razor and a soft toothbrush to minimize bleeding. Avoid contact sports or activities that could cause injury. To reduce nausea, take anti-nausea medications as prescribed by your doctor, and eat small, frequent meals. Avoid sun exposure. Wear SPF 13 (or higher) sunblock and protective clothing. In general, drinking alcoholic beverages should be kept to a minimum or avoided completely. You should discuss this with your doctor. Get plenty of rest.  Maintain good nutrition. If you experience symptoms or side effects, be sure to discuss them with your health care team. They can prescribe medications and/or offer other suggestions that are effective in managing such problems. Monitoring and Testing: You will be checked regularly by your doctor while you are taking Revlimid, to monitor side effects and check your response to therapy.  Periodic blood work will be obtained to monitor your complete blood count (CBC) as well as the function of other organs (such as your kidneys and liver) will also be ordered by your doctor. For females of child-bearing potential (intact uterus, menstrual period within 24 months) a negative pregnancy test may be required monthly before the next month's prescription for Revlimid is given. How Revlimid Works:  Revlimid's exact mechanism of action on cancer cells is not clear. It may act by inhibiting the growth of new blood vessels (angiogenesis) in tumors, enhancing the status of the immune system, or decreasing cytokine and growth factor production. In normal tissue, new blood vessels are formed during tissue growth and repair (i.e. a healing wound), and during the development of baby during pregnancy. Blood vessels carry oxygen and nutrients to tissue that are necessary for growth and survival. In cancer, tumors need blood vessels in order to grow and spread. Through a complex process, endothelial cells (which line the blood vessels) are able to divide and grow and create new blood vessels. This process is called angiogenesis and it occurs in both healthy tissue and in cancerous tissue. Additionally, Revlimid is known to have various effects on the immune system (immunomodulatory agent), which may contribute to its therapeutic effect. Revlimid may also alter the production and activity of cytokines (growth factors) involved in the growth and survival of certain cancer cells. There may be an effect on the genes that direct the cell's growth and activity particularly those associated with cytokines (growth factors), apoptosis (cell death), and metabolism. Note: We strongly encourage you to talk with your health care professional about your specific medical condition and treatments. The information contained in this website is meant to be helpful and educational, but is not a substitute for medical advice.     Have questions about

## 2022-12-22 LAB
KAPPA LC FREE SER-MCNC: 2996.4 MG/L (ref 3.3–19.4)
KAPPA LC FREE/LAMBDA FREE SER: 483.29 {RATIO} (ref 0.26–1.65)
LAMBDA LC FREE SERPL-MCNC: 6.2 MG/L (ref 5.7–26.3)

## 2023-01-03 ENCOUNTER — CLINICAL DOCUMENTATION (OUTPATIENT)
Dept: ONCOLOGY | Age: 53
End: 2023-01-03

## 2023-01-03 NOTE — PROGRESS NOTES
1/3 copy of Dr. Chauncy Aschoff note with his treatment recommendations sent to Dr. Francheska García office ([phone]). Message left on triage line making nurse aware fax with recommendations were sent. Pt aware.

## 2023-01-23 ENCOUNTER — CLINICAL DOCUMENTATION (OUTPATIENT)
Dept: ONCOLOGY | Age: 53
End: 2023-01-23

## 2023-01-23 NOTE — PROGRESS NOTES
1/23 pt called and reported insurance will not cover transplant with [facility]. Appointments canceled.